# Patient Record
Sex: FEMALE
[De-identification: names, ages, dates, MRNs, and addresses within clinical notes are randomized per-mention and may not be internally consistent; named-entity substitution may affect disease eponyms.]

---

## 2023-11-14 PROBLEM — Z00.00 ENCOUNTER FOR PREVENTIVE HEALTH EXAMINATION: Status: ACTIVE | Noted: 2023-11-14

## 2024-05-06 ENCOUNTER — APPOINTMENT (OUTPATIENT)
Dept: ORTHOPEDIC SURGERY | Facility: CLINIC | Age: 39
End: 2024-05-06

## 2024-05-06 ENCOUNTER — APPOINTMENT (OUTPATIENT)
Dept: PAIN MANAGEMENT | Facility: CLINIC | Age: 39
End: 2024-05-06
Payer: COMMERCIAL

## 2024-05-06 VITALS — WEIGHT: 293 LBS | HEIGHT: 61 IN | BODY MASS INDEX: 55.32 KG/M2

## 2024-05-06 DIAGNOSIS — Z82.49 FAMILY HISTORY OF ISCHEMIC HEART DISEASE AND OTHER DISEASES OF THE CIRCULATORY SYSTEM: ICD-10-CM

## 2024-05-06 DIAGNOSIS — R51.9 HEADACHE, UNSPECIFIED: ICD-10-CM

## 2024-05-06 DIAGNOSIS — Z82.61 FAMILY HISTORY OF ARTHRITIS: ICD-10-CM

## 2024-05-06 DIAGNOSIS — M54.2 CERVICALGIA: ICD-10-CM

## 2024-05-06 DIAGNOSIS — Z78.9 OTHER SPECIFIED HEALTH STATUS: ICD-10-CM

## 2024-05-06 DIAGNOSIS — Z85.79 PERSONAL HISTORY OF OTHER MALIGNANT NEOPLASMS OF LYMPHOID, HEMATOPOIETIC AND RELATED TISSUES: ICD-10-CM

## 2024-05-06 PROCEDURE — 99203 OFFICE O/P NEW LOW 30 MIN: CPT | Mod: 25

## 2024-05-06 PROCEDURE — 20552 NJX 1/MLT TRIGGER POINT 1/2: CPT

## 2024-05-06 RX ORDER — METHOCARBAMOL 750 MG/1
750 TABLET, FILM COATED ORAL
Qty: 30 | Refills: 1 | Status: ACTIVE | COMMUNITY
Start: 2024-05-06 | End: 1900-01-01

## 2024-05-06 RX ORDER — PREDNISONE 10 MG/1
10 TABLET ORAL
Qty: 42 | Refills: 0 | Status: ACTIVE | COMMUNITY
Start: 2024-05-06 | End: 1900-01-01

## 2024-05-06 NOTE — PHYSICAL EXAM
[de-identified] : Gen: NAD Head: NC/AT Neck: +myofascial TTP over the right cervical paraspinal region, +TTP over the right suprascapular trapezius Eyes: no glasses, no scleral icterus ENT: mucous membranes moist CV: no JVD Lungs: nonlabored breathing Abd: soft, NT/ND Ext: full ROM in all extremities, no peripheral edema Neuro: CN intact UEs +5 L +5 R shoulder abduction +5 L +5 R arm abduction +5 L +5 R forearm flexion +5 L +5 R forearm extension +5 L +5 R finger flexion +5 L +5 R  strength Psych: normal affect Skin: no visible lesions

## 2024-05-06 NOTE — DISCUSSION/SUMMARY
[de-identified] : DIMITRIOS BAGLEY is a 38 year-old woman presenting for a NPV for a history of neck pain.   Prior treatment: OTC NSAIDs Acetaminophen   Plan: 1) TPI today in the right cervical paraspinal region. The procedure was explained to the patient in detail. Reviewed risks, benefits, and alternatives with the patient. Some risks discussed included temporary increase in pain, bleeding, infection, and side effects from steroids. The patient expressed understanding and would like to proceed.  2) Initiate physical therapy - script provided 3) Trial prednisone taper in 1 week if pain does not improve 4) Trial methocarbamol 750mg qhs prn; Discussed AEs, including sedation, dizziness, somnolence. Patient told to use caution when driving after taking the first few doses. 5) May restart meloxicam 15mg daily prn; patient cautioned NOT to take this while taking steroids 6) RTC prn

## 2024-05-06 NOTE — HISTORY OF PRESENT ILLNESS
[Neck] : neck [9] : 9 [7] : 7 [Dull/Aching] : dull/aching [Sharp] : sharp [Stabbing] : stabbing [Throbbing] : throbbing [Constant] : constant [Household chores] : household chores [Leisure] : leisure [Sleep] : sleep [Nothing helps with pain getting better] : Nothing helps with pain getting better [Lying in bed] : lying in bed [] : no [FreeTextEntry1] : R shoulder  [de-identified] : stretching; moving positions while lying in bed

## 2024-05-06 NOTE — PROCEDURE
[Trigger point 1-2 muscle groups] : trigger point 1-2 muscle groups [Right] : of the right [Cervical paraspinal muscle] : cervical paraspinal muscle [___ cc    1%] : Lidocaine ~Vcc of 1%  [___ cc    10mg] : Triamcinolone (Kenalog) ~Vcc of 10 mg

## 2024-08-16 ENCOUNTER — APPOINTMENT (OUTPATIENT)
Dept: ORTHOPEDIC SURGERY | Facility: CLINIC | Age: 39
End: 2024-08-16
Payer: COMMERCIAL

## 2024-08-16 DIAGNOSIS — M76.62 ACHILLES TENDINITIS, LEFT LEG: ICD-10-CM

## 2024-08-16 PROCEDURE — 99203 OFFICE O/P NEW LOW 30 MIN: CPT

## 2024-08-16 PROCEDURE — 73650 X-RAY EXAM OF HEEL: CPT | Mod: LT

## 2024-08-16 RX ORDER — DICLOFENAC SODIUM 1% 10 MG/G
1 GEL TOPICAL
Qty: 100 | Refills: 3 | Status: ACTIVE | COMMUNITY
Start: 2024-08-16 | End: 1900-01-01

## 2024-08-18 NOTE — IMAGING
[de-identified] : L ankle/foot: - No obvious ankle or foot deformity - Pain over achilles - No pain with palpation of  medial or lateral malleoli, base of 5th metatarsal, navicular, metatarsals, or digits - ROM intact throughout ankle dorsiflexion, plantarflexion, inversion, eversion. Toes with normal flexion and extension. - 5/5 strength throughout. Pain with plantarflexion - Negative anterior drawer, Kleigers, talar tilt, ankle squeeze test - Negative soleus and gastroc pain  - Pain with single leg raise  - Negative calcaneal and metatarsal squeeze - Distally neurovascularly intact   R ankle/foot: - No obvious ankle or foot deformity - No pain with palpation of achilles, medial or lateral malleoli, base of 5th metatarsal, navicular, metatarsals, or digits - ROM intact throughout ankle dorsiflexion, plantarflexion, inversion, eversion. Toes with normal flexion and extension. - 5/5 strength throughout - Distally neurovascularly intact    [Left] : left calcaneus [There are no fractures, subluxations or dislocations. No significant abnormalities are seen] : There are no fractures, subluxations or dislocations. No significant abnormalities are seen

## 2024-08-18 NOTE — ASSESSMENT
[FreeTextEntry1] : - HEP and PT referral - Gel cup for heel - NSAID, voltaren use - Tylenol and ice as needed - Follow up in 6-8 weeks if needed

## 2024-08-18 NOTE — IMAGING
[de-identified] : L ankle/foot: - No obvious ankle or foot deformity - Pain over achilles - No pain with palpation of  medial or lateral malleoli, base of 5th metatarsal, navicular, metatarsals, or digits - ROM intact throughout ankle dorsiflexion, plantarflexion, inversion, eversion. Toes with normal flexion and extension. - 5/5 strength throughout. Pain with plantarflexion - Negative anterior drawer, Kleigers, talar tilt, ankle squeeze test - Negative soleus and gastroc pain  - Pain with single leg raise  - Negative calcaneal and metatarsal squeeze - Distally neurovascularly intact   R ankle/foot: - No obvious ankle or foot deformity - No pain with palpation of achilles, medial or lateral malleoli, base of 5th metatarsal, navicular, metatarsals, or digits - ROM intact throughout ankle dorsiflexion, plantarflexion, inversion, eversion. Toes with normal flexion and extension. - 5/5 strength throughout - Distally neurovascularly intact    [Left] : left calcaneus [There are no fractures, subluxations or dislocations. No significant abnormalities are seen] : There are no fractures, subluxations or dislocations. No significant abnormalities are seen

## 2024-08-18 NOTE — HISTORY OF PRESENT ILLNESS
[6] : 6 [Tightness] : tightness [Intermittent] : intermittent [Stairs] : stairs [Full time] : Work status: full time [de-identified] : 08/16/2024: Patient is a 39 year y.o. female presenting for evaluation of left foot pain 2 months ago without any injury. Pain with walking. No pain at rest. Pain is worse at morning and night. Some pain up the calf over the last 2 days. Hx of ankle sprains when younger. Using meloxicam and tylenol for back pain which is not helping the foot. No swelling or bruising. Feels symptoms on the back of the heel. No weakness.  [] : no [FreeTextEntry1] : left foot  [de-identified] : resting to walking  [de-identified] :

## 2024-08-18 NOTE — HISTORY OF PRESENT ILLNESS
[6] : 6 [Tightness] : tightness [Intermittent] : intermittent [Stairs] : stairs [Full time] : Work status: full time [de-identified] : 08/16/2024: Patient is a 39 year y.o. female presenting for evaluation of left foot pain 2 months ago without any injury. Pain with walking. No pain at rest. Pain is worse at morning and night. Some pain up the calf over the last 2 days. Hx of ankle sprains when younger. Using meloxicam and tylenol for back pain which is not helping the foot. No swelling or bruising. Feels symptoms on the back of the heel. No weakness.  [] : no [FreeTextEntry1] : left foot  [de-identified] : resting to walking  [de-identified] :

## 2024-09-25 ENCOUNTER — APPOINTMENT (OUTPATIENT)
Dept: ORTHOPEDIC SURGERY | Facility: CLINIC | Age: 39
End: 2024-09-25
Payer: COMMERCIAL

## 2024-09-25 DIAGNOSIS — M76.62 ACHILLES TENDINITIS, LEFT LEG: ICD-10-CM

## 2024-09-25 PROCEDURE — 99213 OFFICE O/P EST LOW 20 MIN: CPT

## 2024-09-26 NOTE — IMAGING
[Left] : left calcaneus [There are no fractures, subluxations or dislocations. No significant abnormalities are seen] : There are no fractures, subluxations or dislocations. No significant abnormalities are seen [de-identified] : L ankle/foot: - No obvious ankle or foot deformity - Pain over achilles - No pain with palpation of medial or lateral malleoli, base of 5th metatarsal, navicular, metatarsals, or digits - ROM intact throughout ankle dorsiflexion, plantarflexion, inversion, eversion. Toes with normal flexion and extension. - 5/5 strength throughout. Pain with plantarflexion - Negative anterior drawer, Kleigers, talar tilt, ankle squeeze test - Negative soleus and gastroc pain  - Pain with single leg raise  - Negative calcaneal and metatarsal squeeze - Distally neurovascularly intact   R ankle/foot: - No obvious ankle or foot deformity - No pain with palpation of achilles, medial or lateral malleoli, base of 5th metatarsal, navicular, metatarsals, or digits - ROM intact throughout ankle dorsiflexion, plantarflexion, inversion, eversion. Toes with normal flexion and extension. - 5/5 strength throughout - Distally neurovascularly intact

## 2024-09-26 NOTE — HISTORY OF PRESENT ILLNESS
[6] : 6 [Tightness] : tightness [Intermittent] : intermittent [Stairs] : stairs [Full time] : Work status: full time [de-identified] : 9/25/24: Patient is here to follow up on her left foot. She states pain is unchanged from prior. She is doing physical therapy 2x week. She notes that her ankle strength is improved, however pain persists. She has tried using compression stocking, without improvement, has not yet tried heal wedge.   08/16/2024: Patient is a 39 year y.o. female presenting for evaluation of left foot pain 2 months ago without any injury. Pain with walking. No pain at rest. Pain is worse at morning and night. Some pain up the calf over the last 2 days. Hx of ankle sprains when younger. Using meloxicam and tylenol for back pain which is not helping the foot. No swelling or bruising. Feels symptoms on the back of the heel. No weakness.  [] : no [FreeTextEntry1] : left foot  [de-identified] : resting to walking  [de-identified] :

## 2024-09-26 NOTE — HISTORY OF PRESENT ILLNESS
[6] : 6 [Tightness] : tightness [Intermittent] : intermittent [Stairs] : stairs [Full time] : Work status: full time [de-identified] : 9/25/24: Patient is here to follow up on her left foot. She states pain is unchanged from prior. She is doing physical therapy 2x week. She notes that her ankle strength is improved, however pain persists. She has tried using compression stocking, without improvement, has not yet tried heal wedge.   08/16/2024: Patient is a 39 year y.o. female presenting for evaluation of left foot pain 2 months ago without any injury. Pain with walking. No pain at rest. Pain is worse at morning and night. Some pain up the calf over the last 2 days. Hx of ankle sprains when younger. Using meloxicam and tylenol for back pain which is not helping the foot. No swelling or bruising. Feels symptoms on the back of the heel. No weakness.  [] : no [FreeTextEntry1] : left foot  [de-identified] : resting to walking  [de-identified] :

## 2024-09-26 NOTE — ASSESSMENT
[FreeTextEntry1] : - HEP and PT referral - Gel cup for heel - NSAID, voltaren use - Tylenol and ice as needed - Follow up in 6-8 weeks if needed Detail Level: Generalized Detail Level: Zone

## 2024-09-26 NOTE — IMAGING
[Left] : left calcaneus [There are no fractures, subluxations or dislocations. No significant abnormalities are seen] : There are no fractures, subluxations or dislocations. No significant abnormalities are seen [de-identified] : L ankle/foot: - No obvious ankle or foot deformity - Pain over achilles - No pain with palpation of medial or lateral malleoli, base of 5th metatarsal, navicular, metatarsals, or digits - ROM intact throughout ankle dorsiflexion, plantarflexion, inversion, eversion. Toes with normal flexion and extension. - 5/5 strength throughout. Pain with plantarflexion - Negative anterior drawer, Kleigers, talar tilt, ankle squeeze test - Negative soleus and gastroc pain  - Pain with single leg raise  - Negative calcaneal and metatarsal squeeze - Distally neurovascularly intact   R ankle/foot: - No obvious ankle or foot deformity - No pain with palpation of achilles, medial or lateral malleoli, base of 5th metatarsal, navicular, metatarsals, or digits - ROM intact throughout ankle dorsiflexion, plantarflexion, inversion, eversion. Toes with normal flexion and extension. - 5/5 strength throughout - Distally neurovascularly intact

## 2024-11-11 ENCOUNTER — APPOINTMENT (OUTPATIENT)
Dept: ORTHOPEDIC SURGERY | Facility: CLINIC | Age: 39
End: 2024-11-11
Payer: COMMERCIAL

## 2024-11-11 DIAGNOSIS — M76.62 ACHILLES TENDINITIS, LEFT LEG: ICD-10-CM

## 2024-11-11 DIAGNOSIS — M92.62 JUVENILE OSTEOCHONDROSIS OF TARSUS, LEFT ANKLE: ICD-10-CM

## 2024-11-11 PROCEDURE — 99203 OFFICE O/P NEW LOW 30 MIN: CPT

## 2024-11-11 RX ORDER — MELOXICAM 7.5 MG/1
7.5 TABLET ORAL
Qty: 30 | Refills: 0 | Status: ACTIVE | COMMUNITY
Start: 2024-11-11 | End: 1900-01-01

## 2024-11-13 ENCOUNTER — APPOINTMENT (OUTPATIENT)
Dept: ORTHOPEDIC SURGERY | Facility: CLINIC | Age: 39
End: 2024-11-13

## 2024-12-02 ENCOUNTER — APPOINTMENT (OUTPATIENT)
Dept: ORTHOPEDIC SURGERY | Facility: CLINIC | Age: 39
End: 2024-12-02
Payer: COMMERCIAL

## 2024-12-02 DIAGNOSIS — M92.62 JUVENILE OSTEOCHONDROSIS OF TARSUS, LEFT ANKLE: ICD-10-CM

## 2024-12-02 DIAGNOSIS — M76.62 ACHILLES TENDINITIS, LEFT LEG: ICD-10-CM

## 2024-12-02 PROCEDURE — 99213 OFFICE O/P EST LOW 20 MIN: CPT

## 2024-12-16 ENCOUNTER — APPOINTMENT (OUTPATIENT)
Dept: ORTHOPEDIC SURGERY | Facility: CLINIC | Age: 39
End: 2024-12-16
Payer: COMMERCIAL

## 2024-12-16 DIAGNOSIS — M76.62 ACHILLES TENDINITIS, LEFT LEG: ICD-10-CM

## 2024-12-16 DIAGNOSIS — M92.62 JUVENILE OSTEOCHONDROSIS OF TARSUS, LEFT ANKLE: ICD-10-CM

## 2024-12-16 PROCEDURE — 99213 OFFICE O/P EST LOW 20 MIN: CPT

## 2025-01-13 ENCOUNTER — APPOINTMENT (OUTPATIENT)
Dept: ORTHOPEDIC SURGERY | Facility: CLINIC | Age: 40
End: 2025-01-13
Payer: COMMERCIAL

## 2025-01-13 DIAGNOSIS — M92.62 JUVENILE OSTEOCHONDROSIS OF TARSUS, LEFT ANKLE: ICD-10-CM

## 2025-01-13 DIAGNOSIS — M76.62 ACHILLES TENDINITIS, LEFT LEG: ICD-10-CM

## 2025-01-13 PROCEDURE — 99213 OFFICE O/P EST LOW 20 MIN: CPT

## 2025-02-24 ENCOUNTER — APPOINTMENT (OUTPATIENT)
Dept: ORTHOPEDIC SURGERY | Facility: CLINIC | Age: 40
End: 2025-02-24
Payer: COMMERCIAL

## 2025-02-24 DIAGNOSIS — M76.62 ACHILLES TENDINITIS, LEFT LEG: ICD-10-CM

## 2025-02-24 DIAGNOSIS — M92.62 JUVENILE OSTEOCHONDROSIS OF TARSUS, LEFT ANKLE: ICD-10-CM

## 2025-02-24 PROCEDURE — 99213 OFFICE O/P EST LOW 20 MIN: CPT

## 2025-04-08 ENCOUNTER — NON-APPOINTMENT (OUTPATIENT)
Age: 40
End: 2025-04-08

## 2025-04-10 ENCOUNTER — APPOINTMENT (OUTPATIENT)
Dept: ORTHOPEDIC SURGERY | Facility: CLINIC | Age: 40
End: 2025-04-10
Payer: COMMERCIAL

## 2025-04-10 DIAGNOSIS — M76.62 ACHILLES TENDINITIS, LEFT LEG: ICD-10-CM

## 2025-04-10 DIAGNOSIS — M92.62 JUVENILE OSTEOCHONDROSIS OF TARSUS, LEFT ANKLE: ICD-10-CM

## 2025-04-10 PROCEDURE — 99213 OFFICE O/P EST LOW 20 MIN: CPT

## 2025-04-22 ENCOUNTER — APPOINTMENT (OUTPATIENT)
Dept: MRI IMAGING | Facility: CLINIC | Age: 40
End: 2025-04-22
Payer: COMMERCIAL

## 2025-04-22 PROCEDURE — 73721 MRI JNT OF LWR EXTRE W/O DYE: CPT | Mod: LT

## 2025-05-01 ENCOUNTER — APPOINTMENT (OUTPATIENT)
Dept: ORTHOPEDIC SURGERY | Facility: CLINIC | Age: 40
End: 2025-05-01

## 2025-05-01 DIAGNOSIS — M76.62 ACHILLES TENDINITIS, LEFT LEG: ICD-10-CM

## 2025-05-01 DIAGNOSIS — M92.62 JUVENILE OSTEOCHONDROSIS OF TARSUS, LEFT ANKLE: ICD-10-CM

## 2025-05-01 PROCEDURE — 99214 OFFICE O/P EST MOD 30 MIN: CPT

## 2025-05-01 RX ORDER — LIDOCAINE 5% 700 MG/1
5 PATCH TOPICAL
Qty: 60 | Refills: 0 | Status: ACTIVE | COMMUNITY
Start: 2025-05-01 | End: 1900-01-01